# Patient Record
Sex: MALE | Race: WHITE | Employment: FULL TIME | URBAN - METROPOLITAN AREA
[De-identification: names, ages, dates, MRNs, and addresses within clinical notes are randomized per-mention and may not be internally consistent; named-entity substitution may affect disease eponyms.]

---

## 2023-08-06 NOTE — PROGRESS NOTES
[address] [place] [facility]: New Patient Evaluation. [address] [place] [address]  [phone]    Patient Name:  Kay Ramsey    YOB: 1970            Date of Service:  8/8/2023    Chief Complaint   Patient presents with    New Patient    Sleep Problem       History of Present Illness: This pleasant gentleman came in with his wife today to be evaluated for sleep disordered breathing. Patient's wife indicates he snores and it is rather severe. She indicates because of that it has kept her up quite a bit and they are sleeping in different rooms. She does not report that he is holding his breath and the patient reports she has never woke up gasping. He is occasionally irritable in the mornings does have some dry mouth sometimes. Wakes up and cannot go back to sleep. Does get some racing thoughts. Does have some memory concentration issues and does have some vivid dreaming. Patient does not smoke or drink. Drinks caffeine about 2-3 times a week which is just hot chocolate. Does have a family history of heart disease. Does also have lower testosterone and is on replacement therapy. Patient currently reports goes to bed about 12 1 wakes up at 730-8:30 in the morning. Feels refreshed for 5 workdays. On the weekends feels refreshed 1 out of 2 days and does sleep a little longer. Naps on the weekends and indicates if he sleeps for 20 minutes or over 2 hours he feels better but any of the time he does not feel as good. Currently [place] score is 13/24.     Patient has never had a polysomnogram done in the past.      DIAGNOSTIC TESTS/RESULTS  Sleep Medicine 8/8/2023   Sitting and reading 2   Watching TV 1   Sitting, inactive in a public place (e.g. a theatre or a meeting) 1   As a passenger in a car for an hour without a break 2   Lying down to rest in the afternoon when circumstances permit 3   Sitting and talking to someone 0   Sitting quietly after a lunch without

## 2023-08-08 ENCOUNTER — OFFICE VISIT (OUTPATIENT)
Dept: SLEEP MEDICINE | Age: 53
End: 2023-08-08
Payer: COMMERCIAL

## 2023-08-08 VITALS
HEART RATE: 63 BPM | OXYGEN SATURATION: 98 % | DIASTOLIC BLOOD PRESSURE: 74 MMHG | HEIGHT: 72 IN | BODY MASS INDEX: 26.98 KG/M2 | TEMPERATURE: 97.6 F | RESPIRATION RATE: 14 BRPM | WEIGHT: 199.2 LBS | SYSTOLIC BLOOD PRESSURE: 110 MMHG

## 2023-08-08 DIAGNOSIS — R06.83 SNORING: ICD-10-CM

## 2023-08-08 DIAGNOSIS — R29.818 SUSPECTED SLEEP APNEA: Primary | ICD-10-CM

## 2023-08-08 DIAGNOSIS — R06.83 SNORING: Primary | ICD-10-CM

## 2023-08-08 DIAGNOSIS — G47.10 HYPERSOMNIA, UNSPECIFIED: ICD-10-CM

## 2023-08-08 PROCEDURE — 99204 OFFICE O/P NEW MOD 45 MIN: CPT | Performed by: INTERNAL MEDICINE

## 2023-08-08 RX ORDER — ATORVASTATIN CALCIUM 20 MG/1
TABLET, FILM COATED ORAL
COMMUNITY
Start: 2023-06-14

## 2023-08-08 RX ORDER — ANTIARTHRITIC COMBINATION NO.2 900 MG
TABLET ORAL
COMMUNITY

## 2023-08-08 ASSESSMENT — ENCOUNTER SYMPTOMS
RESPIRATORY NEGATIVE: 1
ALLERGIC/IMMUNOLOGIC NEGATIVE: 1
GASTROINTESTINAL NEGATIVE: 1
EYES NEGATIVE: 1

## 2023-08-08 ASSESSMENT — SLEEP AND FATIGUE QUESTIONNAIRES
HOW LIKELY ARE YOU TO NOD OFF OR FALL ASLEEP WHILE LYING DOWN TO REST IN THE AFTERNOON WHEN CIRCUMSTANCES PERMIT: 3
HOW LIKELY ARE YOU TO NOD OFF OR FALL ASLEEP WHEN YOU ARE A PASSENGER IN A CAR FOR AN HOUR WITHOUT A BREAK: 2
ESS TOTAL SCORE: 12
HOW LIKELY ARE YOU TO NOD OFF OR FALL ASLEEP WHILE SITTING AND TALKING TO SOMEONE: 0
HOW LIKELY ARE YOU TO NOD OFF OR FALL ASLEEP WHILE SITTING AND READING: 2
HOW LIKELY ARE YOU TO NOD OFF OR FALL ASLEEP WHILE SITTING QUIETLY AFTER LUNCH WITHOUT ALCOHOL: 2
HOW LIKELY ARE YOU TO NOD OFF OR FALL ASLEEP WHILE WATCHING TV: 1
HOW LIKELY ARE YOU TO NOD OFF OR FALL ASLEEP WHILE SITTING INACTIVE IN A PUBLIC PLACE: 1
HOW LIKELY ARE YOU TO NOD OFF OR FALL ASLEEP IN A CAR, WHILE STOPPED FOR A FEW MINUTES IN TRAFFIC: 1

## 2023-08-20 ENCOUNTER — HOSPITAL ENCOUNTER (OUTPATIENT)
Dept: SLEEP CENTER | Age: 53
Discharge: HOME | End: 2023-08-23

## 2023-09-25 ENCOUNTER — TELEPHONE (OUTPATIENT)
Dept: SLEEP MEDICINE | Age: 53
End: 2023-09-25

## 2023-10-24 NOTE — PROGRESS NOTES
height. His blood pressure is well controlled today. Download            [place] Sleepiness Scale        10/26/2023     1:40 PM 8/8/2023     2:30 PM   Sleep Medicine   Sitting and reading 2 2   Watching TV 1 1   Sitting, inactive in a public place (e.g. a theatre or a meeting) 1 1   As a passenger in a car for an hour without a break 3 2   Lying down to rest in the afternoon when circumstances permit 3 3   Sitting and talking to someone 0 0   Sitting quietly after a lunch without alcohol 2 2   In a car, while stopped for a few minutes in traffic 0 1   [place] Sleepiness Score 12 12        History reviewed. No pertinent past medical history. Patient Active Problem List   Diagnosis    ALEXANDRA (obstructive sleep apnea)    Snoring    Non-restorative sleep    Hypersomnia          History reviewed. No pertinent surgical history. Social History     Socioeconomic History    Marital status:      Spouse name: Dipti Herndon    Number of children: 2    Years of education: Not on file    Highest education level: Not on file   Occupational History    Occupation: Hospice chaplain   Tobacco Use    Smoking status: Never    Smokeless tobacco: Never   Vaping Use    Vaping Use: Never used   Substance and Sexual Activity    Alcohol use: Never    Drug use: Never    Sexual activity: Yes     Partners: Female     Comment: Wife   Other Topics Concern    Not on file   Social History Narrative    Patient has 1 dog does not sleep with him. Currently drinks caffeine 2-3 times per week.      Social Determinants of Health     Financial Resource Strain: Not on file   Food Insecurity: Not on file   Transportation Needs: Not on file   Physical Activity: Not on file   Stress: Not on file   Social Connections: Not on file   Intimate Partner Violence: Not on file   Housing Stability: Not on file         Family History   Problem Relation Age of Onset    Stroke Maternal Grandfather     Heart Disease Paternal Grandfather     Stroke Paternal

## 2023-10-26 ENCOUNTER — TELEPHONE (OUTPATIENT)
Dept: SLEEP MEDICINE | Age: 53
End: 2023-10-26

## 2023-10-26 ENCOUNTER — OFFICE VISIT (OUTPATIENT)
Dept: SLEEP MEDICINE | Age: 53
End: 2023-10-26
Payer: COMMERCIAL

## 2023-10-26 VITALS
SYSTOLIC BLOOD PRESSURE: 118 MMHG | HEIGHT: 72 IN | TEMPERATURE: 97.3 F | HEART RATE: 64 BPM | OXYGEN SATURATION: 96 % | WEIGHT: 201 LBS | DIASTOLIC BLOOD PRESSURE: 84 MMHG | BODY MASS INDEX: 27.22 KG/M2

## 2023-10-26 DIAGNOSIS — G47.33 OSA (OBSTRUCTIVE SLEEP APNEA): Primary | ICD-10-CM

## 2023-10-26 DIAGNOSIS — G47.10 HYPERSOMNIA: ICD-10-CM

## 2023-10-26 DIAGNOSIS — G47.8 NON-RESTORATIVE SLEEP: ICD-10-CM

## 2023-10-26 DIAGNOSIS — R06.83 SNORING: ICD-10-CM

## 2023-10-26 PROCEDURE — 99213 OFFICE O/P EST LOW 20 MIN: CPT | Performed by: NURSE PRACTITIONER

## 2023-10-26 ASSESSMENT — SLEEP AND FATIGUE QUESTIONNAIRES
HOW LIKELY ARE YOU TO NOD OFF OR FALL ASLEEP IN A CAR, WHILE STOPPED FOR A FEW MINUTES IN TRAFFIC: 0
HOW LIKELY ARE YOU TO NOD OFF OR FALL ASLEEP WHILE WATCHING TV: 1
HOW LIKELY ARE YOU TO NOD OFF OR FALL ASLEEP WHILE SITTING AND TALKING TO SOMEONE: 0
ESS TOTAL SCORE: 12
HOW LIKELY ARE YOU TO NOD OFF OR FALL ASLEEP WHILE SITTING QUIETLY AFTER LUNCH WITHOUT ALCOHOL: 2
HOW LIKELY ARE YOU TO NOD OFF OR FALL ASLEEP WHILE LYING DOWN TO REST IN THE AFTERNOON WHEN CIRCUMSTANCES PERMIT: 3
HOW LIKELY ARE YOU TO NOD OFF OR FALL ASLEEP WHILE SITTING INACTIVE IN A PUBLIC PLACE: 1
HOW LIKELY ARE YOU TO NOD OFF OR FALL ASLEEP WHEN YOU ARE A PASSENGER IN A CAR FOR AN HOUR WITHOUT A BREAK: 3
HOW LIKELY ARE YOU TO NOD OFF OR FALL ASLEEP WHILE SITTING AND READING: 2

## 2023-10-26 NOTE — PATIENT INSTRUCTIONS
Start CPAP 5-15 cm H2O with nightly compliance  New CPAP set up and supplies ordered  Recommendations as above  Follow-up in 4 months or sooner if needed

## 2024-05-01 NOTE — PROGRESS NOTES
Sleepiness Scale        5/2/2024    11:31 AM 10/26/2023     1:40 PM 8/8/2023     2:30 PM   Sleep Medicine   Sitting and reading 1 2 2   Watching TV 1 1 1   Sitting, inactive in a public place (e.g. a theatre or a meeting) 0 1 1   As a passenger in a car for an hour without a break 1 3 2   Lying down to rest in the afternoon when circumstances permit 3 3 3   Sitting and talking to someone 0 0 0   Sitting quietly after a lunch without alcohol 2 2 2   In a car, while stopped for a few minutes in traffic 0 0 1   [place] Sleepiness Score 8 12 12         No past medical history on file.      Patient Active Problem List   Diagnosis    ALEXANDRA (obstructive sleep apnea)    Snoring    Non-restorative sleep    Hypersomnia          No past surgical history on file.        Social History     Socioeconomic History    Marital status:      Spouse name: Sydney    Number of children: 2    Years of education: Not on file    Highest education level: Not on file   Occupational History    Occupation: Hospice chaplain   Tobacco Use    Smoking status: Never    Smokeless tobacco: Never   Vaping Use    Vaping Use: Never used   Substance and Sexual Activity    Alcohol use: Never    Drug use: Never    Sexual activity: Yes     Partners: Female     Comment: Wife   Other Topics Concern    Not on file   Social History Narrative    Patient has 1 dog does not sleep with him.    Currently drinks caffeine 2-3 times per week.     Social Determinants of Health     Financial Resource Strain: Not on file   Food Insecurity: Not on file   Transportation Needs: Not on file   Physical Activity: Not on file   Stress: Not on file   Social Connections: Not on file   Intimate Partner Violence: Not on file   Housing Stability: Not on file         Family History   Problem Relation Age of Onset    Stroke Maternal Grandfather     Heart Disease Paternal Grandfather     Stroke Paternal Grandfather          No Known Allergies      Current Outpatient Medications

## 2024-05-02 ENCOUNTER — OFFICE VISIT (OUTPATIENT)
Dept: SLEEP MEDICINE | Age: 54
End: 2024-05-02
Payer: COMMERCIAL

## 2024-05-02 ENCOUNTER — TELEPHONE (OUTPATIENT)
Dept: SLEEP MEDICINE | Age: 54
End: 2024-05-02

## 2024-05-02 VITALS
DIASTOLIC BLOOD PRESSURE: 89 MMHG | WEIGHT: 211 LBS | OXYGEN SATURATION: 95 % | HEIGHT: 72 IN | SYSTOLIC BLOOD PRESSURE: 137 MMHG | BODY MASS INDEX: 28.58 KG/M2 | HEART RATE: 54 BPM

## 2024-05-02 DIAGNOSIS — G47.10 HYPERSOMNIA: ICD-10-CM

## 2024-05-02 DIAGNOSIS — G47.33 OSA (OBSTRUCTIVE SLEEP APNEA): Primary | ICD-10-CM

## 2024-05-02 DIAGNOSIS — G47.8 NON-RESTORATIVE SLEEP: ICD-10-CM

## 2024-05-02 DIAGNOSIS — R06.83 SNORING: ICD-10-CM

## 2024-05-02 PROCEDURE — 99213 OFFICE O/P EST LOW 20 MIN: CPT | Performed by: NURSE PRACTITIONER

## 2024-05-02 ASSESSMENT — SLEEP AND FATIGUE QUESTIONNAIRES
HOW LIKELY ARE YOU TO NOD OFF OR FALL ASLEEP IN A CAR, WHILE STOPPED FOR A FEW MINUTES IN TRAFFIC: WOULD NEVER DOZE
HOW LIKELY ARE YOU TO NOD OFF OR FALL ASLEEP WHEN YOU ARE A PASSENGER IN A CAR FOR AN HOUR WITHOUT A BREAK: SLIGHT CHANCE OF DOZING
ESS TOTAL SCORE: 8
HOW LIKELY ARE YOU TO NOD OFF OR FALL ASLEEP WHILE SITTING QUIETLY AFTER LUNCH WITHOUT ALCOHOL: MODERATE CHANCE OF DOZING
HOW LIKELY ARE YOU TO NOD OFF OR FALL ASLEEP WHILE SITTING INACTIVE IN A PUBLIC PLACE: WOULD NEVER DOZE
HOW LIKELY ARE YOU TO NOD OFF OR FALL ASLEEP WHILE SITTING AND READING: SLIGHT CHANCE OF DOZING
HOW LIKELY ARE YOU TO NOD OFF OR FALL ASLEEP WHILE LYING DOWN TO REST IN THE AFTERNOON WHEN CIRCUMSTANCES PERMIT: HIGH CHANCE OF DOZING
HOW LIKELY ARE YOU TO NOD OFF OR FALL ASLEEP WHILE SITTING AND TALKING TO SOMEONE: WOULD NEVER DOZE
HOW LIKELY ARE YOU TO NOD OFF OR FALL ASLEEP WHILE WATCHING TV: SLIGHT CHANCE OF DOZING

## 2024-05-02 NOTE — PATIENT INSTRUCTIONS
Continue CPAP at new pressure setting of 7-15 cm H2O with nightly compliance  New CPAP supplies ordered with request for ResMed P10 and ResMed blue chinstrap  Recommendations as above  Follow-up in 4 months or sooner if needed

## 2024-10-07 ENCOUNTER — OFFICE VISIT (OUTPATIENT)
Dept: SLEEP MEDICINE | Age: 54
End: 2024-10-07
Payer: COMMERCIAL

## 2024-10-07 VITALS
TEMPERATURE: 97.3 F | WEIGHT: 207 LBS | HEIGHT: 72 IN | DIASTOLIC BLOOD PRESSURE: 86 MMHG | RESPIRATION RATE: 18 BRPM | SYSTOLIC BLOOD PRESSURE: 132 MMHG | BODY MASS INDEX: 28.04 KG/M2 | OXYGEN SATURATION: 96 % | HEART RATE: 65 BPM

## 2024-10-07 DIAGNOSIS — G47.33 OSA (OBSTRUCTIVE SLEEP APNEA): Primary | ICD-10-CM

## 2024-10-07 PROCEDURE — 99213 OFFICE O/P EST LOW 20 MIN: CPT | Performed by: NURSE PRACTITIONER

## 2024-10-07 PROCEDURE — G2211 COMPLEX E/M VISIT ADD ON: HCPCS | Performed by: NURSE PRACTITIONER

## 2024-10-07 ASSESSMENT — SLEEP AND FATIGUE QUESTIONNAIRES
HOW LIKELY ARE YOU TO NOD OFF OR FALL ASLEEP WHILE SITTING AND TALKING TO SOMEONE: WOULD NEVER DOZE
HOW LIKELY ARE YOU TO NOD OFF OR FALL ASLEEP WHILE SITTING AND READING: SLIGHT CHANCE OF DOZING
ESS TOTAL SCORE: 9
HOW LIKELY ARE YOU TO NOD OFF OR FALL ASLEEP WHILE WATCHING TV: SLIGHT CHANCE OF DOZING
HOW LIKELY ARE YOU TO NOD OFF OR FALL ASLEEP IN A CAR, WHILE STOPPED FOR A FEW MINUTES IN TRAFFIC: WOULD NEVER DOZE
HOW LIKELY ARE YOU TO NOD OFF OR FALL ASLEEP WHILE SITTING QUIETLY AFTER LUNCH WITHOUT ALCOHOL: SLIGHT CHANCE OF DOZING
HOW LIKELY ARE YOU TO NOD OFF OR FALL ASLEEP WHILE LYING DOWN TO REST IN THE AFTERNOON WHEN CIRCUMSTANCES PERMIT: HIGH CHANCE OF DOZING
HOW LIKELY ARE YOU TO NOD OFF OR FALL ASLEEP WHILE SITTING INACTIVE IN A PUBLIC PLACE: SLIGHT CHANCE OF DOZING
HOW LIKELY ARE YOU TO NOD OFF OR FALL ASLEEP WHEN YOU ARE A PASSENGER IN A CAR FOR AN HOUR WITHOUT A BREAK: MODERATE CHANCE OF DOZING

## 2024-10-07 NOTE — PATIENT INSTRUCTIONS
Continue CPAP at new pressure settings of 8-13 cm H2O with nightly compliance  New CPAP supplies ordered  Recommendations as above  Follow-up in 6 months or sooner if needed

## 2024-10-07 NOTE — PROGRESS NOTES
[facility]  [address]  [place] [address]  [phone]    Patient Name:  Bajlit Sheridan  YOB: 1970      Office Visit 10/7/2024    CHIEF COMPLAINT:    Chief Complaint   Patient presents with    Sleep Apnea         HISTORY OF PRESENT ILLNESS:  Patient is a 54 y.o. male seen today for follow up of ALEXANDRA. Diagnostic sleep study on 08/20/2023 with an AHI of 11.2 and lowest oxygen saturation 83%. He is prescribed cpap therapy with a humidifier set at 5-15 cm with a full face mask. Most recent download reveals AHI on PAP therapy is 6.6, leak is median 0.2 and 9.6 at 95th percentile and the hourly usage is 6 hours 13 minutes nightly. The overall use is 1032 hours with days greater than four hours at 162/166. The patient is compliant with the Pap therapy and is feeling better as a result.   He reports that he is sleeping well and awakens refreshed.  He denies any excessive daytime sleepiness.  [place] score is 9/24.  Has been noted that his central apneas have slightly increased since his last visit.  It does appear that he may have some increasing pressure as well.  I did discuss increasing his pressure and next visit if his central apneas are not lowe with may need to order a BiPAP ST titration.  He denies any major medical changes since his last visit.  Reports that his weight has consistently been around 205 pounds.  His blood pressure is well-controlled today.    [place] Sleepiness Scale      10/7/2024     2:36 PM 5/2/2024    11:31 AM 10/26/2023     1:40 PM 8/8/2023     2:30 PM   Sleep Medicine   Sitting and reading 1 1 2 2   Watching TV 1 1 1 1   Sitting, inactive in a public place (e.g. a theatre or a meeting) 1 0 1 1   As a passenger in a car for an hour without a break 2 1 3 2   Lying down to rest in the afternoon when circumstances permit 3 3 3 3   Sitting and talking to someone 0 0 0 0   Sitting quietly after a lunch without alcohol 1 2 2 2   In a car, while stopped

## 2024-11-01 ENCOUNTER — TELEPHONE (OUTPATIENT)
Age: 54
End: 2024-11-01

## 2025-01-15 ENCOUNTER — OFFICE VISIT (OUTPATIENT)
Age: 55
End: 2025-01-15
Payer: COMMERCIAL

## 2025-01-15 VITALS
BODY MASS INDEX: 28.71 KG/M2 | WEIGHT: 212 LBS | HEART RATE: 58 BPM | HEIGHT: 72 IN | SYSTOLIC BLOOD PRESSURE: 132 MMHG | DIASTOLIC BLOOD PRESSURE: 88 MMHG

## 2025-01-15 DIAGNOSIS — Z76.89 ENCOUNTER TO ESTABLISH CARE: Primary | ICD-10-CM

## 2025-01-15 DIAGNOSIS — E78.2 HYPERLIPIDEMIA, MIXED: ICD-10-CM

## 2025-01-15 DIAGNOSIS — R07.2 PRECORDIAL PAIN: ICD-10-CM

## 2025-01-15 PROCEDURE — 93000 ELECTROCARDIOGRAM COMPLETE: CPT | Performed by: INTERNAL MEDICINE

## 2025-01-15 PROCEDURE — 99204 OFFICE O/P NEW MOD 45 MIN: CPT | Performed by: INTERNAL MEDICINE

## 2025-01-15 ASSESSMENT — ENCOUNTER SYMPTOMS
NAUSEA: 0
BLURRED VISION: 0
VOMITING: 0
BACK PAIN: 0
DOUBLE VISION: 0
BLOATING: 0
HEMOPTYSIS: 0
COUGH: 0
SHORTNESS OF BREATH: 0
ORTHOPNEA: 0
ABDOMINAL PAIN: 0

## 2025-01-15 NOTE — PROGRESS NOTES
hour(s)).  No results found for: \"CHOL\", \"CHOLPOCT\", \"CHLST\", \"CHOLV\", \"HDL\", \"HDLPOC\", \"HDLC\", \"LDL\", \"LDLC\", \"VLDLC\", \"VLDL\"    No results found for any visits on 01/15/25.    ASSESSMENT and PLAN    Baljit was seen today for consultation, new patient and establish cardiologist.    Diagnoses and all orders for this visit:    Encounter to establish care  -     EKG 12 Lead    Precordial pain  -     Echo (TTE) complete (PRN contrast/bubble/strain/3D); Future    Hyperlipidemia, mixed  -     Lipid Panel; Future        Overall Impression    Chest discomfort  -Atypical symptoms.  Follow-up echocardiogram.  No further ischemic evaluation warranted at this time.    Hyperlipidemia  -Previously was on statin therapy but noted with mildly elevated ALT in the 50s and appears statin therapy held around 5/2024.  -Last LDL at 100 from 5/2024; plan repeat off statin therapy.  Discussed assess 10-year ASCVD scores on follow-up with repeat labs and address consideration for further therapy.  -Consideration for calcium scoring to help guide therapy and reassess on follow-up.    Family history of coronary disease  -Nonpremature and with extended family; see above.    Other-Labs reviewed with CBC okay from 5/2/2024; TSH okay at 3.2 from 5/2/2024    Return in about 2 months (around 3/15/2025).     Pato Bautista MD  1/15/2025  9:32 AM

## 2025-03-05 ENCOUNTER — OFFICE VISIT (OUTPATIENT)
Age: 55
End: 2025-03-05
Payer: COMMERCIAL

## 2025-03-05 VITALS
WEIGHT: 215 LBS | BODY MASS INDEX: 29.12 KG/M2 | DIASTOLIC BLOOD PRESSURE: 88 MMHG | HEART RATE: 72 BPM | HEIGHT: 72 IN | SYSTOLIC BLOOD PRESSURE: 124 MMHG

## 2025-03-05 DIAGNOSIS — E78.2 HYPERLIPIDEMIA, MIXED: Primary | ICD-10-CM

## 2025-03-05 DIAGNOSIS — R07.2 PRECORDIAL PAIN: ICD-10-CM

## 2025-03-05 PROCEDURE — 99214 OFFICE O/P EST MOD 30 MIN: CPT | Performed by: INTERNAL MEDICINE

## 2025-03-05 RX ORDER — ROSUVASTATIN CALCIUM 20 MG/1
20 TABLET, COATED ORAL
Qty: 90 TABLET | Refills: 3 | Status: SHIPPED | OUTPATIENT
Start: 2025-03-05

## 2025-03-05 ASSESSMENT — ENCOUNTER SYMPTOMS
SHORTNESS OF BREATH: 0
NAUSEA: 0
COUGH: 0
BLURRED VISION: 0
DOUBLE VISION: 0
HEMOPTYSIS: 0
ABDOMINAL PAIN: 0
VOMITING: 0
BACK PAIN: 0
ORTHOPNEA: 0
BLOATING: 0

## 2025-03-05 NOTE — PROGRESS NOTES
[facility]  [address] [place] [address], [address]  [place] [id number]  PHONE: [phone]    25    NAME:  Baljit Sheridan  : 1970  MRN: [id number]         SUBJECTIVE:   Baljit Sheridan is a 54 y.o. male seen for a visit regarding the following:     Chief Complaint   Patient presents with    Follow-up    Results    Hyperlipidemia       HPI:      No prior history of coronary disease.  History of ALEXANDRA.  Echocardiogram with preserved EF and normal wall motion (2025)    Doing well since last visit.  Denies any recurrent chest discomfort.  Noted recent LDL at 191 from 2025.    Occasional chest pressure; mostly noted at rest/with eating certain meals; transient symptoms.  Last episode around 6 months ago.  Can walk over a couple miles with no issues.  No exertional chest discomfort/[place] exertion.  No palpitations.  Well-controlled home Bps.    Chest pain-atypical, hyperlipidemia-not controlled, sleep apnea-controlled    Past Medical History, Past Surgical History, Family history, Social History, and Medications were all reviewed with the patient today and updated as necessary.     No Known Allergies  Patient Active Problem List   Diagnosis    ALEXANDRA (obstructive sleep apnea)    Snoring    Non-restorative sleep    Hypersomnia     Past Medical History:   Diagnosis Date    Sleep apnea      Past Surgical History:   Procedure Laterality Date    APPENDECTOMY       Family History   Problem Relation Age of Onset    Stroke Maternal Grandfather     Stroke Paternal Grandfather     Heart Attack Other     Heart Disease Other     Heart Failure Other      Social History     Tobacco Use    Smoking status: Never    Smokeless tobacco: Never   Substance Use Topics    Alcohol use: Never     Current Outpatient Medications   Medication Sig Dispense Refill    rosuvastatin (CRESTOR) 20 MG tablet Take 1 tablet by mouth nightly 90 tablet 3    DHEA 25 MG TABS       COENZYME Q10-OMEGA 3 FATTY ACD PO Take by mouth       No current

## 2025-03-13 ENCOUNTER — OFFICE VISIT (OUTPATIENT)
Dept: SLEEP MEDICINE | Age: 55
End: 2025-03-13
Payer: COMMERCIAL

## 2025-03-13 VITALS
HEIGHT: 72 IN | SYSTOLIC BLOOD PRESSURE: 123 MMHG | BODY MASS INDEX: 28.99 KG/M2 | OXYGEN SATURATION: 94 % | WEIGHT: 214 LBS | HEART RATE: 73 BPM | RESPIRATION RATE: 17 BRPM | DIASTOLIC BLOOD PRESSURE: 83 MMHG

## 2025-03-13 DIAGNOSIS — G47.10 HYPERSOMNIA: ICD-10-CM

## 2025-03-13 DIAGNOSIS — G47.33 OSA (OBSTRUCTIVE SLEEP APNEA): Primary | ICD-10-CM

## 2025-03-13 DIAGNOSIS — G47.8 NON-RESTORATIVE SLEEP: ICD-10-CM

## 2025-03-13 DIAGNOSIS — R06.83 SNORING: ICD-10-CM

## 2025-03-13 PROCEDURE — 99214 OFFICE O/P EST MOD 30 MIN: CPT | Performed by: NURSE PRACTITIONER

## 2025-03-13 ASSESSMENT — SLEEP AND FATIGUE QUESTIONNAIRES
HOW LIKELY ARE YOU TO NOD OFF OR FALL ASLEEP WHILE SITTING QUIETLY AFTER LUNCH WITHOUT ALCOHOL: MODERATE CHANCE OF DOZING
ESS TOTAL SCORE: 12
HOW LIKELY ARE YOU TO NOD OFF OR FALL ASLEEP WHILE SITTING AND TALKING TO SOMEONE: WOULD NEVER DOZE
HOW LIKELY ARE YOU TO NOD OFF OR FALL ASLEEP IN A CAR, WHILE STOPPED FOR A FEW MINUTES IN TRAFFIC: WOULD NEVER DOZE
HOW LIKELY ARE YOU TO NOD OFF OR FALL ASLEEP WHILE WATCHING TV: MODERATE CHANCE OF DOZING
HOW LIKELY ARE YOU TO NOD OFF OR FALL ASLEEP WHILE SITTING AND READING: MODERATE CHANCE OF DOZING
HOW LIKELY ARE YOU TO NOD OFF OR FALL ASLEEP WHILE LYING DOWN TO REST IN THE AFTERNOON WHEN CIRCUMSTANCES PERMIT: HIGH CHANCE OF DOZING
HOW LIKELY ARE YOU TO NOD OFF OR FALL ASLEEP WHEN YOU ARE A PASSENGER IN A CAR FOR AN HOUR WITHOUT A BREAK: MODERATE CHANCE OF DOZING
HOW LIKELY ARE YOU TO NOD OFF OR FALL ASLEEP WHILE SITTING INACTIVE IN A PUBLIC PLACE: SLIGHT CHANCE OF DOZING

## 2025-03-13 ASSESSMENT — ENCOUNTER SYMPTOMS
SORE THROAT: 0
APNEA: 0
VOICE CHANGE: 0
SHORTNESS OF BREATH: 0

## 2025-03-13 NOTE — PATIENT INSTRUCTIONS
Sample N30i mask, AirTouch provided   Continue Auto CPAP at current settings     Consider oral appliance as another treatment option.      ________________________________________________________________________________    Mild/Moderate Sleep Apnea can be treated with a custom-fit oral appliance.       CPAP comfort cover may be able to help with mask leaks.   Available for purchase online at [email or web address]

## 2025-03-13 NOTE — PROGRESS NOTES
[facility]  [address]  [place] [address]  [phone]    Patient Name:  Baljit Sheridan  YOB: 1970      Office Visit 3/13/2025    CHIEF COMPLAINT:    Chief Complaint   Patient presents with    Sleep Apnea         HISTORY OF PRESENT ILLNESS:  Patient is a 54 y.o. male seen today for follow up of ALEXANDRA. He is accompanied by his wife Sydney. Diagnostic sleep study on 08/20/2023 with an AHI of 11.2 and lowest oxygen saturation 83%.  Patient is prescribed cpap therapy with a humidifier set at 8-13 cmH2O with a full face mask. Most recent download from 9/21/94 - 12/19/2024 reveals AHI on PAP therapy is 6.4, leak is 0 L/min and 5.2 L/min at the 95th percentile, and the hourly usage is 6 hours 36 minutes nightly. The usage days greater than four hours is 72/90.     The patient stopped using the CPAP for the past three months. He cannot tell a difference from when he does and does not use the CPAP; we wakes up feeling the same.  Patient is having more sleep disruption on CPAP, than without due to contact dermatitis from the mask, mask leaks, and wife states he is still snoring without CPAP therapy.  In reviewing his HST from 8/20/2023, he had overall mild sleep apnea, worse in supine sleep, without any significant nocturnal hypoxemia. He only had 2 central apneas in the current study.    Patient reports that he usually sleeps through the night and has one episode of nocturia. He denies swelling in his legs.    [place] is 12/24 today, which has increased by 3 points since his last visit 6 months ago.    BP is well controlled. Patient has gained about 15 lbs since his original sleep study in 2023.      REVIEW OF SYSTEMS:    Review of Systems   Constitutional:  Negative for activity change, fatigue and unexpected weight change.   HENT:  Negative for congestion, nosebleeds, sore throat and voice change.    Respiratory:  Negative for apnea and shortness of breath.    Cardiovascular:

## 2025-07-10 ENCOUNTER — TELEPHONE (OUTPATIENT)
Age: 55
End: 2025-07-10

## 2025-07-10 NOTE — TELEPHONE ENCOUNTER
Patient's wife is calling with a billing issue. She states she called the billing office and she was told it wasn't a billing issue, it is a coding issue done in the office. Pt's wife would like a call back regarding this issue.

## 2025-07-11 ENCOUNTER — TELEPHONE (OUTPATIENT)
Age: 55
End: 2025-07-11

## 2025-07-14 NOTE — TELEPHONE ENCOUNTER
Spoke with pt's spouse, both are pt's of Dr. Bautista. Pt and spouse had OV dated 03/5/25.  She has question about billing will route to Nanette Christopher to address.

## 2025-07-21 ENCOUNTER — OFFICE VISIT (OUTPATIENT)
Dept: SLEEP MEDICINE | Age: 55
End: 2025-07-21
Payer: COMMERCIAL

## 2025-07-21 VITALS
HEIGHT: 72 IN | HEART RATE: 85 BPM | OXYGEN SATURATION: 96 % | WEIGHT: 216 LBS | DIASTOLIC BLOOD PRESSURE: 85 MMHG | SYSTOLIC BLOOD PRESSURE: 131 MMHG | BODY MASS INDEX: 29.26 KG/M2 | RESPIRATION RATE: 14 BRPM

## 2025-07-21 DIAGNOSIS — G47.33 OSA (OBSTRUCTIVE SLEEP APNEA): Primary | ICD-10-CM

## 2025-07-21 PROCEDURE — 99213 OFFICE O/P EST LOW 20 MIN: CPT | Performed by: NURSE PRACTITIONER

## 2025-07-21 ASSESSMENT — SLEEP AND FATIGUE QUESTIONNAIRES
HOW LIKELY ARE YOU TO NOD OFF OR FALL ASLEEP WHILE SITTING AND READING: SLIGHT CHANCE OF DOZING
HOW LIKELY ARE YOU TO NOD OFF OR FALL ASLEEP WHEN YOU ARE A PASSENGER IN A CAR FOR AN HOUR WITHOUT A BREAK: SLIGHT CHANCE OF DOZING
HOW LIKELY ARE YOU TO NOD OFF OR FALL ASLEEP WHILE SITTING QUIETLY AFTER LUNCH WITHOUT ALCOHOL: SLIGHT CHANCE OF DOZING
HOW LIKELY ARE YOU TO NOD OFF OR FALL ASLEEP WHILE LYING DOWN TO REST IN THE AFTERNOON WHEN CIRCUMSTANCES PERMIT: HIGH CHANCE OF DOZING
HOW LIKELY ARE YOU TO NOD OFF OR FALL ASLEEP WHILE SITTING AND TALKING TO SOMEONE: WOULD NEVER DOZE
HOW LIKELY ARE YOU TO NOD OFF OR FALL ASLEEP WHILE SITTING INACTIVE IN A PUBLIC PLACE: SLIGHT CHANCE OF DOZING
HOW LIKELY ARE YOU TO NOD OFF OR FALL ASLEEP IN A CAR, WHILE STOPPED FOR A FEW MINUTES IN TRAFFIC: WOULD NEVER DOZE
ESS TOTAL SCORE: 9
HOW LIKELY ARE YOU TO NOD OFF OR FALL ASLEEP WHILE WATCHING TV: MODERATE CHANCE OF DOZING

## 2025-07-21 NOTE — PROGRESS NOTES
thyroid enlargement.  No cervical adenopathy.   LUNGS:   Normal respiratory effort with symmetrical lung expansion.   Breath sounds clear.   HEART:   There is a regular rate and rhythm.  No murmur, rub, or gallop.  There is no edema in the lower extremities.   NEURO:   The patient is alert and oriented to person, place, and time.  Memory appears intact and mood is normal.  No gross sensorimotor deficits are present.          ASSESSMENT:  (Medical Decision Making)    Assessment & Plan  1. Sleep Apnea.  His CPAP usage has been excellent, and his apnea hypopnea index has improved from 6.6 to 5.7 over the last 90 days. He feels comfortable with his current nasal pillow mask and does not wish to pursue another sleep study or oral appliance at this time. His blood pressure is normal. He was advised to replace the nasal cushions every 2 to 3 months, the headgear and main tubing every 6 months, and the filters every 2 to 3 months. A supply order will be renewed to ensure he has the necessary supplies for his mask.            ICD-10-CM    1. ALEXANDRA (obstructive sleep apnea)  G47.33 DME - DURABLE MEDICAL EQUIPMENT - Patient is using, compliant and benefiting from Pap therapy. Continue current settings as AHI is down to 5.7 events per hour.            PLAN:    Continue CPAP 8-13 cm H2O with nightly compliance  Prescription for CPAP supplies renewed  Recommendations as above  Follow-up in 6 months or sooner if needed    Orders Placed This Encounter   Procedures    DME - DURABLE MEDICAL EQUIPMENT     [facility] [place]  Phone: [address]  [facility] [id number]  Dept: [phone]      Patient Name: Baljit Sheridan  : 1970  Gender: male  Address: [address] [place], [place] [address]   Patient phone: [phone] (home)       Primary [place] / [place] / Product Type: *No Product type* /   Subscriber ID: [id number] - (Ramo JESUS)      AMB Supply Order  Order

## 2025-07-21 NOTE — PATIENT INSTRUCTIONS
Continue CPAP 8-13 cm H2O with nightly compliance  Prescription for CPAP supplies renewed  Recommendations as above  Follow-up in 6 months or sooner if needed

## 2025-07-23 NOTE — TELEPHONE ENCOUNTER
Pt's wife is calling regarding appt that her  had on 3/5/25. She also had an appt that day w/ Dr Bautista. She's wanting to know why his appt was Level 4 as opposed to her appt was level 3. Please yohan.